# Patient Record
Sex: MALE | Race: WHITE | URBAN - METROPOLITAN AREA
[De-identification: names, ages, dates, MRNs, and addresses within clinical notes are randomized per-mention and may not be internally consistent; named-entity substitution may affect disease eponyms.]

---

## 2017-01-11 LAB
CREATININE, EXTERNAL: 0.95
PSA, EXTERNAL: 0.5

## 2017-02-21 ENCOUNTER — OFFICE VISIT (OUTPATIENT)
Dept: ENDOCRINOLOGY | Age: 57
End: 2017-02-21

## 2017-02-21 VITALS
BODY MASS INDEX: 31.01 KG/M2 | HEIGHT: 73 IN | DIASTOLIC BLOOD PRESSURE: 84 MMHG | RESPIRATION RATE: 20 BRPM | OXYGEN SATURATION: 96 % | WEIGHT: 234 LBS | TEMPERATURE: 96.6 F | HEART RATE: 62 BPM | SYSTOLIC BLOOD PRESSURE: 130 MMHG

## 2017-02-21 DIAGNOSIS — E03.4 HYPOTHYROIDISM DUE TO ACQUIRED ATROPHY OF THYROID: Primary | ICD-10-CM

## 2017-02-21 RX ORDER — LEVOTHYROXINE SODIUM 25 UG/1
TABLET ORAL
COMMUNITY

## 2017-02-21 RX ORDER — CALCIUM CARBONATE 500(1250)
TABLET ORAL DAILY
COMMUNITY

## 2017-02-21 RX ORDER — ATORVASTATIN CALCIUM 10 MG/1
TABLET, FILM COATED ORAL DAILY
COMMUNITY

## 2017-02-21 NOTE — PROGRESS NOTES
Wt Readings from Last 3 Encounters:   02/21/17 234 lb (106.1 kg)     Temp Readings from Last 3 Encounters:   02/21/17 96.6 °F (35.9 °C) (Oral)     BP Readings from Last 3 Encounters:   02/21/17 130/84     Pulse Readings from Last 3 Encounters:   02/21/17 62

## 2017-02-21 NOTE — LETTER
2/21/2017 9:30 PM 
 
Patient:  Evaristo Gutierrez YOB: 1960 Date of Visit: 2/21/2017 Dear BELINDA Bernstein 
[place] [address] [address] [facility] [address] VIA Facsimile: [phone] 
 : 
 
 
Thank you for referring Mr. Evaristo Gutierrez to me for evaluation/treatment. Below are the relevant portions of my assessment and plan of care. HISTORY OF PRESENT ILLNESS Evaristo Gutierrez is a 49 y.o. male. HPI Initial visit for hypothyroidism management Referred by Ms. BELINDA Sebastien Soto TSH is over 5.4  In jan 2017 He has been diagnosed with hypothyroidism many years ago He has been not compliant with taking synthroid , 25 mcg a day He has not felt much difference When he had above labs, he was not taking the med He has no hypothyroid symptoms at all Past Medical History Diagnosis Date  
 Skin cancer of scalp Social History Social History  Marital status: UNKNOWN Spouse name: N/A  
 Number of children: N/A  
 Years of education: N/A Occupational History  Not on file. Social History Main Topics  Smoking status: Never Smoker  Smokeless tobacco: Not on file  Alcohol use Not on file  Drug use: Not on file  Sexual activity: Not on file Other Topics Concern  Not on file Social History Narrative  No narrative on file History reviewed. No pertinent family history. Review of Systems Constitutional: Negative. HENT: Negative. Eyes: Negative for pain and redness. Respiratory: Negative. Cardiovascular: Negative for chest pain, palpitations and leg swelling. Gastrointestinal: Negative. Negative for constipation. Genitourinary: Negative. Musculoskeletal: Negative for myalgias. Skin: Negative. Neurological: Negative. Endo/Heme/Allergies: Negative. Psychiatric/Behavioral: Negative for depression and memory loss. The patient does not have insomnia.    
 
 
Physical Exam  
 Constitutional: He is oriented to person, place, and time. He appears well-developed and well-nourished. HENT:  
Head: Normocephalic. Eyes: Conjunctivae and EOM are normal. Pupils are equal, round, and reactive to light. Neck: Normal range of motion. Neck supple. No JVD present. No tracheal deviation present. No thyromegaly present. Cardiovascular: Normal rate, regular rhythm and normal heart sounds. Pulmonary/Chest: Effort normal and breath sounds normal.  
Abdominal: Soft. Bowel sounds are normal.  
Musculoskeletal: Normal range of motion. Lymphadenopathy:  
  He has no cervical adenopathy. Neurological: He is alert and oriented to person, place, and time. He has normal reflexes. Skin: Skin is warm. Psychiatric: He has a normal mood and affect. ASSESSMENT and PLAN 
 
hypothyroidism : sub-clinical  
Very mildly elevated TSh of over 5 He is asymptomatic He is given the option of not taking at all Versus He can continue taking the pill diligently without discontinuing it Educated on proper intake of medication F/u in 2 months  
> 50 % of time is spent on counseling If you have questions, please do not hesitate to call me. I look forward to following Mr. Patrice Field along with you. Sincerely, Felix Dixon MD

## 2017-02-21 NOTE — MR AVS SNAPSHOT
Visit Information Date & Time Provider Department Dept. Phone Encounter #  
 2/21/2017  4:00 PM Hellen Quintero MD [facility] [phone] [id number] Follow-up Instructions Return in about 2 months (around 4/21/2017). Upcoming Health Maintenance Date Due Hepatitis C Screening 1960 DTaP/Tdap/Td series (1 - Tdap) 3/15/1983 FOBT Q 1 YEAR AGE 50-75 3/15/2012 INFLUENZA AGE 9 TO ADULT 8/1/2016 Allergies as of 2/21/2017  Review Complete On: 2/21/2017 By: Hellen Quintero MD  
 No Known Allergies Current Immunizations  Never Reviewed No immunizations on file. Not reviewed this visit You Were Diagnosed With   
  
 Codes Comments Hypothyroidism due to acquired atrophy of thyroid    -  Primary ICD-10-CM: E03.4 ICD-9-CM: 244.8, 246.8 Vitals BP Pulse Temp Resp Height(growth percentile) Weight(growth percentile) 130/84 62 96.6 °F (35.9 °C) (Oral) 20 6' 1\" (1.854 m) 234 lb (106.1 kg) SpO2 BMI Smoking Status 96% 30.87 kg/m2 Never Smoker Vitals History BMI and BSA Data Body Mass Index Body Surface Area  
 30.87 kg/m 2 2.34 m 2 Your Updated Medication List  
  
   
This list is accurate as of: 2/21/17  4:57 PM.  Always use your most recent med list.  
  
  
  
  
 calcium carbonate 500 mg calcium (1,250 mg) tablet Commonly known as:  OS-YOEL Take  by mouth daily. CELEBREX PO Take  by mouth.  
  
 levothyroxine 25 mcg tablet Commonly known as:  SYNTHROID Take  by mouth Daily (before breakfast). LIPITOR 10 mg tablet Generic drug:  atorvastatin Take  by mouth daily. Follow-up Instructions Return in about 2 months (around 4/21/2017). To-Do List   
 04/17/2017 Lab:  T4, FREE   
  
 04/17/2017 Lab:  THYROID PEROXIDASE (TPO) AB   
  
 04/17/2017 Lab:  TSH 3RD GENERATION Patient Instructions Synthroid 25 mcg  A day, on empty stomach with water only, no other meds or food or drinks   For next half hour Take any kind of vitamins, calcium, iron   Pills  4 hours later Introducing [facility]! [facility] introduces [employer] patient portal. Now you can access parts of your medical record, email your doctor's office, and request medication refills online. 1. In your internet browser, go to [email or web address] 2. Click on the First Time User? Click Here link in the Sign In box. You will see the New Member Sign Up page. 3. Enter your [employer] Access Code exactly as it appears below. You will not need to use this code after youve completed the sign-up process. If you do not sign up before the expiration date, you must request a new code. · [employer] Access Code: [email or web address] Expires: 5/22/2017  4:57 PM 
 
4. Enter the last four digits of your Social Security Number (xxxx) and Date of Birth (mm/dd/yyyy) as indicated and click Submit. You will be taken to the next sign-up page. 5. Create a [employer] ID. This will be your [employer] login ID and cannot be changed, so think of one that is secure and easy to remember. 6. Create a [employer] password. You can change your password at any time. 7. Enter your Password Reset Question and Answer. This can be used at a later time if you forget your password. 8. Enter your e-mail address. You will receive e-mail notification when new information is available in [address] 9. Click Sign Up. You can now view and download portions of your medical record. 10. Click the Download Summary menu link to download a portable copy of your medical information. If you have questions, please visit the Frequently Asked Questions section of the [employer] website. Remember, [employer] is NOT to be used for urgent needs. For medical emergencies, dial 911. Now available from your iPhone and Android! Please provide this summary of care documentation to your next provider. Your primary care clinician is listed as Kristen Spatz A M Ansarie. If you have any questions after today's visit, please call [phone].

## 2017-02-21 NOTE — PATIENT INSTRUCTIONS
Synthroid 25 mcg  A day, on empty stomach with water only, no other meds or food or drinks   For next half hour       Take any kind of vitamins, calcium, iron   Pills  4 hours later

## 2017-02-21 NOTE — PROGRESS NOTES
HISTORY OF PRESENT ILLNESS  Welby Meigs is a 49 y.o. male. HPI  Initial visit for hypothyroidism management   Referred by Ms. BELINDA Radhika Woodardde  TSH is over 5.4  In jan 2017     He has been diagnosed with hypothyroidism many years ago   He has been not compliant with taking synthroid , 25 mcg a day     He has not felt much difference     When he had above labs, he was not taking the med     He has no hypothyroid symptoms at all         Past Medical History   Diagnosis Date    Skin cancer of scalp        Social History     Social History    Marital status: UNKNOWN     Spouse name: N/A    Number of children: N/A    Years of education: N/A     Occupational History    Not on file. Social History Main Topics    Smoking status: Never Smoker    Smokeless tobacco: Not on file    Alcohol use Not on file    Drug use: Not on file    Sexual activity: Not on file     Other Topics Concern    Not on file     Social History Narrative    No narrative on file       History reviewed. No pertinent family history. Review of Systems   Constitutional: Negative. HENT: Negative. Eyes: Negative for pain and redness. Respiratory: Negative. Cardiovascular: Negative for chest pain, palpitations and leg swelling. Gastrointestinal: Negative. Negative for constipation. Genitourinary: Negative. Musculoskeletal: Negative for myalgias. Skin: Negative. Neurological: Negative. Endo/Heme/Allergies: Negative. Psychiatric/Behavioral: Negative for depression and memory loss. The patient does not have insomnia. Physical Exam   Constitutional: He is oriented to person, place, and time. He appears well-developed and well-nourished. HENT:   Head: Normocephalic. Eyes: Conjunctivae and EOM are normal. Pupils are equal, round, and reactive to light. Neck: Normal range of motion. Neck supple. No JVD present. No tracheal deviation present. No thyromegaly present.    Cardiovascular: Normal rate, regular rhythm and normal heart sounds. Pulmonary/Chest: Effort normal and breath sounds normal.   Abdominal: Soft. Bowel sounds are normal.   Musculoskeletal: Normal range of motion. Lymphadenopathy:     He has no cervical adenopathy. Neurological: He is alert and oriented to person, place, and time. He has normal reflexes. Skin: Skin is warm. Psychiatric: He has a normal mood and affect.        ASSESSMENT and PLAN    hypothyroidism : sub-clinical   Very mildly elevated TSh of over 5   He is asymptomatic     He is given the option of not taking at all  Versus   He can continue taking the pill diligently without discontinuing it     Educated on proper intake of medication     F/u in 2 months   > 50 % of time is spent on counseling

## 2017-04-28 ENCOUNTER — OFFICE VISIT (OUTPATIENT)
Dept: ENDOCRINOLOGY | Age: 57
End: 2017-04-28

## 2017-04-28 VITALS
OXYGEN SATURATION: 95 % | DIASTOLIC BLOOD PRESSURE: 76 MMHG | HEART RATE: 61 BPM | WEIGHT: 236 LBS | BODY MASS INDEX: 31.28 KG/M2 | SYSTOLIC BLOOD PRESSURE: 112 MMHG | HEIGHT: 73 IN | TEMPERATURE: 97.3 F | RESPIRATION RATE: 20 BRPM

## 2017-04-28 DIAGNOSIS — E03.4 HYPOTHYROIDISM DUE TO ACQUIRED ATROPHY OF THYROID: Primary | ICD-10-CM

## 2017-04-28 NOTE — PROGRESS NOTES
HISTORY OF PRESENT ILLNESS  Raya Small is a 56 y.o. male. HPI  First f/u after initial visit for hypothyroidism management   From feb 2017     He has taken synthroid 25 mcg dose diligently   He feels a bit more energetic         Referred by Ms. Kathleen TREVINO Listen  TSH is over 5.4  In jan 2017     He has been diagnosed with hypothyroidism many years ago   He has been not compliant with taking synthroid , 25 mcg a day     He has not felt much difference     When he had above labs, he was not taking the med     He has no hypothyroid symptoms at all         Past Medical History:   Diagnosis Date    Skin cancer of scalp        Social History     Social History    Marital status: UNKNOWN     Spouse name: N/A    Number of children: N/A    Years of education: N/A     Occupational History    Not on file. Social History Main Topics    Smoking status: Never Smoker    Smokeless tobacco: Not on file    Alcohol use Not on file    Drug use: Not on file    Sexual activity: Not on file     Other Topics Concern    Not on file     Social History Narrative       History reviewed. No pertinent family history. Review of Systems   Constitutional: Negative. HENT: Negative. Eyes: Negative for pain and redness. Respiratory: Negative. Cardiovascular: Negative for chest pain, palpitations and leg swelling. Gastrointestinal: Negative. Negative for constipation. Genitourinary: Negative. Musculoskeletal: Negative for myalgias. Skin: Negative. Neurological: Negative. Endo/Heme/Allergies: Negative. Psychiatric/Behavioral: Negative for depression and memory loss. The patient does not have insomnia. Physical Exam   Constitutional: He is oriented to person, place, and time. He appears well-developed and well-nourished. HENT:   Head: Normocephalic. Eyes: Conjunctivae and EOM are normal. Pupils are equal, round, and reactive to light. Neck: Normal range of motion. Neck supple.  No JVD present. No tracheal deviation present. No thyromegaly present. Cardiovascular: Normal rate, regular rhythm and normal heart sounds. Pulmonary/Chest: Effort normal and breath sounds normal.   Abdominal: Soft. Bowel sounds are normal.   Musculoskeletal: Normal range of motion. Lymphadenopathy:     He has no cervical adenopathy. Neurological: He is alert and oriented to person, place, and time. He has normal reflexes. Skin: Skin is warm. Psychiatric: He has a normal mood and affect.        ASSESSMENT and PLAN    hypothyroidism : sub-clinical   Very mildly elevated TSh of over 5   He is asymptomatic     He was given the option of not taking at all  Versus   He can continue taking the pill at 25 mcg synthroid,  diligently without discontinuing it     He opted for second and has taken it well   He did feel more energetic     Will do labs today and will advise any changes over the phone     F/u PRN   > 50 % of time is spent on counseling

## 2017-04-28 NOTE — MR AVS SNAPSHOT
Visit Information Date & Time Provider Department Dept. Phone Encounter #  
 4/28/2017  1:45 PM Nj Mustafa MD [facility] [phone] [id number] Follow-up Instructions Return if symptoms worsen or fail to improve. Your Appointments 4/28/2017  1:45 PM  
ROUTINE CARE with Nj Mustafa MD  
[facility] [address] [facility]) Appt Note: f/u 2 month; r/s from 4/19/17/$0 cp/kharris/4/17/17; 04/27/17 LVM for pt to confirm [address] [facility] [address] [facility] [address]  
[phone]  
  
   
 [address] [place] [address] Upcoming Health Maintenance Date Due Hepatitis C Screening 1960 DTaP/Tdap/Td series (1 - Tdap) 3/15/1983 INFLUENZA AGE 9 TO ADULT 8/1/2016 FOBT Q 1 YEAR AGE 50-75 11/1/2017 Allergies as of 4/28/2017  Review Complete On: 4/28/2017 By: Nj Mustafa MD  
 No Known Allergies Current Immunizations  Never Reviewed No immunizations on file. Not reviewed this visit You Were Diagnosed With   
  
 Codes Comments Hypothyroidism due to acquired atrophy of thyroid    -  Primary ICD-10-CM: E03.4 ICD-9-CM: 244.8, 246.8 Vitals BP Pulse Temp Resp Height(growth percentile) Weight(growth percentile) 112/76 61 97.3 °F (36.3 °C) (Oral) 20 6' 1\" (1.854 m) 236 lb (107 kg) SpO2 BMI Smoking Status 95% 31.14 kg/m2 Never Smoker BMI and BSA Data Body Mass Index Body Surface Area  
 31.14 kg/m 2 2.35 m 2 Your Updated Medication List  
  
   
This list is accurate as of: 4/28/17  1:37 PM.  Always use your most recent med list.  
  
  
  
  
 calcium carbonate 500 mg calcium (1,250 mg) tablet Commonly known as:  OS-YOEL Take  by mouth daily. CELEBREX PO Take  by mouth.  
  
 levothyroxine 25 mcg tablet Commonly known as:  SYNTHROID Take  by mouth Daily (before breakfast). LIPITOR 10 mg tablet Generic drug:  atorvastatin Take  by mouth daily. We Performed the Following T4, FREE [id number] CPT(R)] THYROID PEROXIDASE (TPO) AB [94180 CPT(R)] TSH 3RD GENERATION [57642 CPT(R)] Follow-up Instructions Return if symptoms worsen or fail to improve. Patient Instructions Synthroid 25 mcg  A day, on empty stomach with water only, no other meds or food or drinks   For next half hour Take any kind of vitamins, calcium, iron   Pills  4 hours later Introducing [facility]! L[facility] Mejia introduces [employer] patient portal. Now you can access parts of your medical record, email your doctor's office, and request medication refills online. 1. In your internet browser, go to [email or web address] 2. Click on the First Time User? Click Here link in the Sign In box. You will see the New Member Sign Up page. 3. Enter your [employer] Access Code exactly as it appears below. You will not need to use this code after youve completed the sign-up process. If you do not sign up before the expiration date, you must request a new code. · [employer] Access Code: [email or web address] Expires: 5/22/2017  5:57 PM 
 
4. Enter the last four digits of your Social Security Number (xxxx) and Date of Birth (mm/dd/yyyy) as indicated and click Submit. You will be taken to the next sign-up page. 5. Create a [employer] ID. This will be your [employer] login ID and cannot be changed, so think of one that is secure and easy to remember. 6. Create a [employer] password. You can change your password at any time. 7. Enter your Password Reset Question and Answer. This can be used at a later time if you forget your password. 8. Enter your e-mail address. You will receive e-mail notification when new information is available in [address] 9. Click Sign Up. You can now view and download portions of your medical record. 10. Click the Download Summary menu link to download a portable copy of your medical information. If you have questions, please visit the Frequently Asked Questions section of the [employer] website. Remember, [employer] is NOT to be used for urgent needs. For medical emergencies, dial 911. Now available from your iPhone and Android! Please provide this summary of care documentation to your next provider. Your primary care clinician is listed as Anai Vo. If you have any questions after today's visit, please call [phone].

## 2017-04-28 NOTE — PROGRESS NOTES
Wt Readings from Last 3 Encounters:   04/28/17 236 lb (107 kg)   02/21/17 234 lb (106.1 kg)     Temp Readings from Last 3 Encounters:   04/28/17 97.3 °F (36.3 °C) (Oral)   02/21/17 96.6 °F (35.9 °C) (Oral)     BP Readings from Last 3 Encounters:   04/28/17 112/76   02/21/17 130/84     Pulse Readings from Last 3 Encounters:   04/28/17 61   02/21/17 62

## 2017-04-29 LAB
T4 FREE SERPL-MCNC: 1.25 NG/DL (ref 0.82–1.77)
THYROPEROXIDASE AB SERPL-ACNC: 6 IU/ML (ref 0–34)
TSH SERPL DL<=0.005 MIU/L-ACNC: 3.7 UIU/ML (ref 0.45–4.5)

## 2017-04-29 NOTE — PROGRESS NOTES
Inform that thyrodi labs are good   He can stay on same dose 25 mcg synthroid , refill the medication plz

## 2017-08-02 ENCOUNTER — TELEPHONE (OUTPATIENT)
Dept: ENDOCRINOLOGY | Age: 57
End: 2017-08-02

## 2017-08-04 ENCOUNTER — TELEPHONE (OUTPATIENT)
Dept: ENDOCRINOLOGY | Age: 57
End: 2017-08-04

## 2017-08-04 NOTE — TELEPHONE ENCOUNTER
Informed pt of lab results and to stay on 25 mcg dose of Synthroid.  Lab orders mailed to address provided by pt